# Patient Record
Sex: FEMALE | Race: WHITE
[De-identification: names, ages, dates, MRNs, and addresses within clinical notes are randomized per-mention and may not be internally consistent; named-entity substitution may affect disease eponyms.]

---

## 2017-12-18 ENCOUNTER — HOSPITAL ENCOUNTER (OUTPATIENT)
Dept: HOSPITAL 19 - MC.RAD | Age: 62
End: 2017-12-18
Payer: COMMERCIAL

## 2017-12-18 DIAGNOSIS — Z12.31: Primary | ICD-10-CM

## 2017-12-18 DIAGNOSIS — Z92.3: ICD-10-CM

## 2017-12-18 DIAGNOSIS — Z98.890: ICD-10-CM

## 2017-12-18 DIAGNOSIS — Z85.3: ICD-10-CM

## 2019-01-07 ENCOUNTER — HOSPITAL ENCOUNTER (OUTPATIENT)
Dept: HOSPITAL 19 - MC.RAD | Age: 64
End: 2019-01-07
Attending: FAMILY MEDICINE
Payer: COMMERCIAL

## 2019-01-07 DIAGNOSIS — Z98.82: ICD-10-CM

## 2019-01-07 DIAGNOSIS — Z98.890: ICD-10-CM

## 2019-01-07 DIAGNOSIS — Z85.3: ICD-10-CM

## 2019-01-07 DIAGNOSIS — Z12.31: Primary | ICD-10-CM

## 2019-01-07 DIAGNOSIS — Z92.3: ICD-10-CM

## 2020-02-06 ENCOUNTER — HOSPITAL ENCOUNTER (OUTPATIENT)
Dept: HOSPITAL 19 - MC.RAD | Age: 65
End: 2020-02-06
Attending: FAMILY MEDICINE
Payer: COMMERCIAL

## 2020-02-06 DIAGNOSIS — Z12.31: Primary | ICD-10-CM

## 2020-02-06 DIAGNOSIS — Z85.3: ICD-10-CM

## 2021-05-21 ENCOUNTER — HOSPITAL ENCOUNTER (OUTPATIENT)
Dept: HOSPITAL 19 - MC.RAD | Age: 66
End: 2021-05-21
Attending: FAMILY MEDICINE
Payer: COMMERCIAL

## 2021-05-21 DIAGNOSIS — Z12.31: Primary | ICD-10-CM

## 2022-05-31 ENCOUNTER — HOSPITAL ENCOUNTER (OUTPATIENT)
Dept: HOSPITAL 19 - MC.RAD | Age: 67
End: 2022-05-31
Payer: COMMERCIAL

## 2022-05-31 DIAGNOSIS — Z85.3: ICD-10-CM

## 2022-05-31 DIAGNOSIS — Z12.31: Primary | ICD-10-CM

## 2022-11-01 ENCOUNTER — HOSPITAL ENCOUNTER (OUTPATIENT)
Dept: HOSPITAL 19 - SDCO | Age: 67
Discharge: HOME | End: 2022-11-01
Attending: SURGERY
Payer: COMMERCIAL

## 2022-11-01 VITALS — HEART RATE: 80 BPM | TEMPERATURE: 96.6 F | DIASTOLIC BLOOD PRESSURE: 91 MMHG | SYSTOLIC BLOOD PRESSURE: 144 MMHG

## 2022-11-01 VITALS — HEIGHT: 62.99 IN | WEIGHT: 136.25 LBS | BODY MASS INDEX: 24.14 KG/M2

## 2022-11-01 VITALS — DIASTOLIC BLOOD PRESSURE: 74 MMHG | SYSTOLIC BLOOD PRESSURE: 131 MMHG | HEART RATE: 72 BPM

## 2022-11-01 VITALS — SYSTOLIC BLOOD PRESSURE: 124 MMHG | HEART RATE: 72 BPM | DIASTOLIC BLOOD PRESSURE: 69 MMHG

## 2022-11-01 VITALS — TEMPERATURE: 98.7 F | SYSTOLIC BLOOD PRESSURE: 140 MMHG | HEART RATE: 80 BPM | DIASTOLIC BLOOD PRESSURE: 71 MMHG

## 2022-11-01 VITALS — SYSTOLIC BLOOD PRESSURE: 135 MMHG | DIASTOLIC BLOOD PRESSURE: 71 MMHG | HEART RATE: 68 BPM

## 2022-11-01 DIAGNOSIS — C77.2: ICD-10-CM

## 2022-11-01 DIAGNOSIS — C77.5: ICD-10-CM

## 2022-11-01 DIAGNOSIS — Z45.2: Primary | ICD-10-CM

## 2022-11-01 DIAGNOSIS — Z85.3: ICD-10-CM

## 2022-11-01 DIAGNOSIS — C20: ICD-10-CM

## 2022-11-01 PROCEDURE — C1788 PORT, INDWELLING, IMP: HCPCS

## 2022-11-01 NOTE — NUR
1144 RETURNS TO ROOM 5 PER CART FROM OR.  DROWSY, AROUSES EASILY, FAMILIARIZED
WITH SURROUNDINGS.  RESP UNLABORED.  LUNGS CLEAR TO AUSCULTATION.  BANDAID
LEFT NECK DRY/INTACT.  GAUZE DRESSING LEFT CHEST CLEAN DRY AND INTACT.  DENIES
PAIN.   IN ROOM.  CALL LIGHT AT SIDE.
 
1215 TOLERATES PO WATER AND MUFFIN WITHOUT NAUSEA.  DENIES PAIN.
1225 DISCHARGE INSTRUCTIONS REVIEWED WITH PATIENT AND  VERBALIZING
UNDERSTANDING.
1242 SITS ON EDGE OF BED.  DRESSES SELF.

## 2024-07-16 ENCOUNTER — HOSPITAL ENCOUNTER (OUTPATIENT)
Dept: HOSPITAL 19 - SDCO | Age: 69
Discharge: HOME | End: 2024-07-16
Attending: INTERNAL MEDICINE
Payer: COMMERCIAL

## 2024-07-16 VITALS — TEMPERATURE: 97.6 F | DIASTOLIC BLOOD PRESSURE: 75 MMHG | HEART RATE: 67 BPM | SYSTOLIC BLOOD PRESSURE: 139 MMHG

## 2024-07-16 VITALS — WEIGHT: 139.99 LBS | BODY MASS INDEX: 24.8 KG/M2 | HEIGHT: 63 IN

## 2024-07-16 VITALS — DIASTOLIC BLOOD PRESSURE: 60 MMHG | SYSTOLIC BLOOD PRESSURE: 106 MMHG | HEART RATE: 57 BPM

## 2024-07-16 VITALS — HEART RATE: 63 BPM | DIASTOLIC BLOOD PRESSURE: 60 MMHG | SYSTOLIC BLOOD PRESSURE: 123 MMHG | TEMPERATURE: 98.3 F

## 2024-07-16 DIAGNOSIS — C20: ICD-10-CM

## 2024-07-16 DIAGNOSIS — Z79.899: ICD-10-CM

## 2024-07-16 DIAGNOSIS — Z93.3: ICD-10-CM

## 2024-07-16 DIAGNOSIS — I10: ICD-10-CM

## 2024-07-16 DIAGNOSIS — Z87.891: ICD-10-CM

## 2024-07-16 DIAGNOSIS — Z85.038: ICD-10-CM

## 2024-07-16 DIAGNOSIS — D12.2: ICD-10-CM

## 2024-07-16 DIAGNOSIS — Z92.21: ICD-10-CM

## 2024-07-16 DIAGNOSIS — Z12.11: Primary | ICD-10-CM

## 2024-07-16 DIAGNOSIS — Z98.0: ICD-10-CM

## 2024-07-16 DIAGNOSIS — Z92.3: ICD-10-CM

## 2024-07-16 DIAGNOSIS — Z85.3: ICD-10-CM
